# Patient Record
Sex: MALE
[De-identification: names, ages, dates, MRNs, and addresses within clinical notes are randomized per-mention and may not be internally consistent; named-entity substitution may affect disease eponyms.]

---

## 2018-12-10 ENCOUNTER — TRANSCRIPTION ENCOUNTER (OUTPATIENT)
Age: 48
End: 2018-12-10

## 2021-01-12 ENCOUNTER — APPOINTMENT (OUTPATIENT)
Dept: PULMONOLOGY | Facility: CLINIC | Age: 51
End: 2021-01-12
Payer: COMMERCIAL

## 2021-01-12 ENCOUNTER — APPOINTMENT (OUTPATIENT)
Dept: PULMONOLOGY | Facility: CLINIC | Age: 51
End: 2021-01-12

## 2021-01-12 VITALS
DIASTOLIC BLOOD PRESSURE: 80 MMHG | SYSTOLIC BLOOD PRESSURE: 120 MMHG | TEMPERATURE: 97.8 F | OXYGEN SATURATION: 96 % | HEART RATE: 80 BPM

## 2021-01-12 DIAGNOSIS — K21.9 GASTRO-ESOPHAGEAL REFLUX DISEASE W/OUT ESOPHAGITIS: ICD-10-CM

## 2021-01-12 DIAGNOSIS — J42 UNSPECIFIED CHRONIC BRONCHITIS: ICD-10-CM

## 2021-01-12 PROBLEM — Z00.00 ENCOUNTER FOR PREVENTIVE HEALTH EXAMINATION: Status: ACTIVE | Noted: 2021-01-12

## 2021-01-12 PROCEDURE — 99204 OFFICE O/P NEW MOD 45 MIN: CPT

## 2021-01-12 PROCEDURE — 99072 ADDL SUPL MATRL&STAF TM PHE: CPT

## 2021-01-12 RX ORDER — IBUPROFEN 600 MG/1
600 TABLET, FILM COATED ORAL
Qty: 28 | Refills: 0 | Status: ACTIVE | COMMUNITY
Start: 2020-08-26

## 2021-01-12 RX ORDER — LANSOPRAZOLE 30 MG/1
30 CAPSULE, DELAYED RELEASE ORAL
Qty: 30 | Refills: 0 | Status: ACTIVE | COMMUNITY
Start: 2020-12-28

## 2021-01-12 RX ORDER — AMOXICILLIN 500 MG/1
500 TABLET, FILM COATED ORAL
Qty: 21 | Refills: 0 | Status: ACTIVE | COMMUNITY
Start: 2020-08-26

## 2021-01-12 RX ORDER — BUPROPION HYDROCHLORIDE 150 MG/1
150 TABLET, EXTENDED RELEASE ORAL
Qty: 30 | Refills: 0 | Status: ACTIVE | COMMUNITY
Start: 2020-12-28

## 2021-01-12 NOTE — PROCEDURE
[FreeTextEntry1] : cat scan chest upper lobe emphysema and ch bronchitis\par centrilobular ground glass nodularity cw bronchiolitis

## 2021-01-12 NOTE — HISTORY OF PRESENT ILLNESS
[TextBox_4] : 50 year old male quit tobacco 1 week ago 2 packs per day  max for 30 yrs\par Here today because of chronic cough and has worsened  and pt developed chest pain and ct chest revealed emphysema\par Pt notes wheezing for past year  and is unchanged\par chest pain as resolved  ?soreness in chest\par no phlegm no hemoptysis\par no change in exercise ability \par works with power lines \par worked with asbestos but used full suits\par no change with position\par today feels good but concerned regarding ct chest [id number] Detailed

## 2021-01-12 NOTE — DISCUSSION/SUMMARY
[FreeTextEntry1] : patient with copd secondary to tobacco use\par will review images when available\par Pt denies all symptoms so major medication is to avoid tobacco \par no medication at this time\par pulmonary function tests next visit\par Patient understands and agrees with plan of care \par

## 2021-01-12 NOTE — REASON FOR VISIT
[Initial] : an initial visit [Abnormal CXR/ Chest CT] : an abnormal CXR/ chest CT [TextBox_44] : pt. had CT scan last Monday and it showed stage 1 emphysema and chronic bronchitis.

## 2021-02-16 ENCOUNTER — APPOINTMENT (OUTPATIENT)
Dept: PULMONOLOGY | Facility: CLINIC | Age: 51
End: 2021-02-16

## 2021-02-23 ENCOUNTER — APPOINTMENT (OUTPATIENT)
Dept: PULMONOLOGY | Facility: CLINIC | Age: 51
End: 2021-02-23
Payer: COMMERCIAL

## 2021-02-23 VITALS
HEART RATE: 88 BPM | OXYGEN SATURATION: 98 % | TEMPERATURE: 97.3 F | SYSTOLIC BLOOD PRESSURE: 123 MMHG | DIASTOLIC BLOOD PRESSURE: 73 MMHG

## 2021-02-23 DIAGNOSIS — J43.9 EMPHYSEMA, UNSPECIFIED: ICD-10-CM

## 2021-02-23 DIAGNOSIS — Z87.891 PERSONAL HISTORY OF NICOTINE DEPENDENCE: ICD-10-CM

## 2021-02-23 PROCEDURE — 99072 ADDL SUPL MATRL&STAF TM PHE: CPT

## 2021-02-23 PROCEDURE — 99214 OFFICE O/P EST MOD 30 MIN: CPT

## 2021-02-23 NOTE — DISCUSSION/SUMMARY
[FreeTextEntry1] : pt with chronic obstructive airways disease and denies any further symptoms\par will schedule for pulmonary function tests and I will call pt re same\par NOt candidate for low dose cancer screening cat scan chest until 54 year old \par no fu needed at this time \par Patient understands and agrees with plan of care

## 2021-02-23 NOTE — HISTORY OF PRESENT ILLNESS
[Former] : former [Never] : never [TextBox_4] : 50 male quit tobacco 1 month ago \par here today for fu and pulmonary function tests \par today feels good no wheeze no tightness in chest  no hemoptysis \par some dyspnea on exertion \par no exercise limitation\par no nite awakening\par given inhaler by pmd but no longer using  [TextBox_11] : 1.5 [YearQuit] : 2021

## 2021-02-23 NOTE — REASON FOR VISIT
[Follow-Up] : a follow-up visit [TextBox_44] : PT is a 51 y/o male, here to see what the next step of the care plan is. PT denies any complications, and is feeling better then first visit.

## 2021-03-08 ENCOUNTER — APPOINTMENT (OUTPATIENT)
Dept: PULMONOLOGY | Facility: CLINIC | Age: 51
End: 2021-03-08

## 2025-05-06 ENCOUNTER — NON-APPOINTMENT (OUTPATIENT)
Age: 55
End: 2025-05-06

## 2025-07-27 ENCOUNTER — NON-APPOINTMENT (OUTPATIENT)
Age: 55
End: 2025-07-27